# Patient Record
Sex: MALE | Race: WHITE | Employment: OTHER
[De-identification: names, ages, dates, MRNs, and addresses within clinical notes are randomized per-mention and may not be internally consistent; named-entity substitution may affect disease eponyms.]

---

## 2017-02-27 PROCEDURE — 83520 IMMUNOASSAY QUANT NOS NONAB: CPT | Performed by: INTERNAL MEDICINE

## 2017-11-29 PROBLEM — Z92.3 S/P RADIOACTIVE IODINE THYROID ABLATION: Status: ACTIVE | Noted: 2017-11-29

## 2018-01-10 ENCOUNTER — SURGERY (OUTPATIENT)
Age: 73
End: 2018-01-10

## 2018-01-10 ENCOUNTER — HOSPITAL ENCOUNTER (OUTPATIENT)
Facility: HOSPITAL | Age: 73
Setting detail: HOSPITAL OUTPATIENT SURGERY
Discharge: HOME | End: 2018-01-10
Attending: INTERNAL MEDICINE | Admitting: INTERNAL MEDICINE
Payer: MEDICARE

## 2018-01-10 ENCOUNTER — ANESTHESIA (OUTPATIENT)
Dept: ENDOSCOPY | Facility: HOSPITAL | Age: 73
End: 2018-01-10
Payer: MEDICARE

## 2018-01-10 ENCOUNTER — ANESTHESIA EVENT (OUTPATIENT)
Dept: ENDOSCOPY | Facility: HOSPITAL | Age: 73
End: 2018-01-10
Payer: MEDICARE

## 2018-01-10 VITALS
OXYGEN SATURATION: 93 % | TEMPERATURE: 99 F | RESPIRATION RATE: 16 BRPM | DIASTOLIC BLOOD PRESSURE: 93 MMHG | HEIGHT: 72 IN | WEIGHT: 135 LBS | BODY MASS INDEX: 18.28 KG/M2 | SYSTOLIC BLOOD PRESSURE: 162 MMHG | HEART RATE: 78 BPM

## 2018-01-10 DIAGNOSIS — Z92.3 S/P RADIOACTIVE IODINE THYROID ABLATION: ICD-10-CM

## 2018-01-10 DIAGNOSIS — J43.1 PANLOBULAR EMPHYSEMA (HCC): ICD-10-CM

## 2018-01-10 DIAGNOSIS — Z85.828 PERSONAL HISTORY OF SKIN CANCER: ICD-10-CM

## 2018-01-10 DIAGNOSIS — F17.200 TOBACCO USE DISORDER: ICD-10-CM

## 2018-01-10 DIAGNOSIS — L57.0 ACTINIC KERATOSIS: ICD-10-CM

## 2018-01-10 DIAGNOSIS — K26.5 DUODENAL ULCER PERFORATION (HCC): ICD-10-CM

## 2018-01-10 DIAGNOSIS — L81.4 SOLAR LENTIGINOSIS: ICD-10-CM

## 2018-01-10 DIAGNOSIS — E83.52 HYPERCALCEMIA: ICD-10-CM

## 2018-01-10 DIAGNOSIS — L82.1 OTHER SEBORRHEIC KERATOSIS: ICD-10-CM

## 2018-01-10 DIAGNOSIS — E78.2 MIXED HYPERLIPIDEMIA: ICD-10-CM

## 2018-01-10 DIAGNOSIS — E05.90 HYPERTHYROIDISM: ICD-10-CM

## 2018-01-10 DIAGNOSIS — I10 ESSENTIAL HYPERTENSION, BENIGN: Primary | ICD-10-CM

## 2018-01-10 DIAGNOSIS — D75.89 MACROCYTOSIS: ICD-10-CM

## 2018-01-10 PROCEDURE — 88341 IMHCHEM/IMCYTCHM EA ADD ANTB: CPT | Performed by: INTERNAL MEDICINE

## 2018-01-10 PROCEDURE — 81235 EGFR GENE COM VARIANTS: CPT | Performed by: INTERNAL MEDICINE

## 2018-01-10 PROCEDURE — 88177 CYTP FNA EVAL EA ADDL: CPT | Performed by: INTERNAL MEDICINE

## 2018-01-10 PROCEDURE — 0BB48ZX EXCISION OF RIGHT UPPER LOBE BRONCHUS, VIA NATURAL OR ARTIFICIAL OPENING ENDOSCOPIC, DIAGNOSTIC: ICD-10-PCS | Performed by: INTERNAL MEDICINE

## 2018-01-10 PROCEDURE — 88342 IMHCHEM/IMCYTCHM 1ST ANTB: CPT | Performed by: INTERNAL MEDICINE

## 2018-01-10 PROCEDURE — 88381 MICRODISSECTION MANUAL: CPT | Performed by: INTERNAL MEDICINE

## 2018-01-10 PROCEDURE — 81210 BRAF GENE: CPT | Performed by: INTERNAL MEDICINE

## 2018-01-10 PROCEDURE — 88173 CYTOPATH EVAL FNA REPORT: CPT | Performed by: INTERNAL MEDICINE

## 2018-01-10 PROCEDURE — 88172 CYTP DX EVAL FNA 1ST EA SITE: CPT | Performed by: INTERNAL MEDICINE

## 2018-01-10 PROCEDURE — 88360 TUMOR IMMUNOHISTOCHEM/MANUAL: CPT | Performed by: INTERNAL MEDICINE

## 2018-01-10 PROCEDURE — 88305 TISSUE EXAM BY PATHOLOGIST: CPT | Performed by: INTERNAL MEDICINE

## 2018-01-10 RX ORDER — SODIUM CHLORIDE, SODIUM LACTATE, POTASSIUM CHLORIDE, CALCIUM CHLORIDE 600; 310; 30; 20 MG/100ML; MG/100ML; MG/100ML; MG/100ML
INJECTION, SOLUTION INTRAVENOUS CONTINUOUS
Status: DISCONTINUED | OUTPATIENT
Start: 2018-01-10 | End: 2018-01-10 | Stop reason: HOSPADM

## 2018-01-10 RX ORDER — SODIUM CHLORIDE, SODIUM LACTATE, POTASSIUM CHLORIDE, CALCIUM CHLORIDE 600; 310; 30; 20 MG/100ML; MG/100ML; MG/100ML; MG/100ML
INJECTION, SOLUTION INTRAVENOUS CONTINUOUS
Status: DISCONTINUED | OUTPATIENT
Start: 2018-01-10 | End: 2018-01-10

## 2018-01-10 RX ORDER — ONDANSETRON 2 MG/ML
4 INJECTION INTRAMUSCULAR; INTRAVENOUS AS NEEDED
Status: DISCONTINUED | OUTPATIENT
Start: 2018-01-10 | End: 2018-01-10 | Stop reason: HOSPADM

## 2018-01-10 RX ORDER — MEPERIDINE HYDROCHLORIDE 25 MG/ML
12.5 INJECTION INTRAMUSCULAR; INTRAVENOUS; SUBCUTANEOUS AS NEEDED
Status: DISCONTINUED | OUTPATIENT
Start: 2018-01-10 | End: 2018-01-10 | Stop reason: HOSPADM

## 2018-01-10 RX ORDER — MIDAZOLAM HYDROCHLORIDE 1 MG/ML
1 INJECTION INTRAMUSCULAR; INTRAVENOUS EVERY 5 MIN PRN
Status: DISCONTINUED | OUTPATIENT
Start: 2018-01-10 | End: 2018-01-10 | Stop reason: HOSPADM

## 2018-01-10 RX ORDER — HYDROMORPHONE HYDROCHLORIDE 1 MG/ML
0.4 INJECTION, SOLUTION INTRAMUSCULAR; INTRAVENOUS; SUBCUTANEOUS EVERY 5 MIN PRN
Status: DISCONTINUED | OUTPATIENT
Start: 2018-01-10 | End: 2018-01-10 | Stop reason: HOSPADM

## 2018-01-10 RX ORDER — NALOXONE HYDROCHLORIDE 0.4 MG/ML
80 INJECTION, SOLUTION INTRAMUSCULAR; INTRAVENOUS; SUBCUTANEOUS AS NEEDED
Status: DISCONTINUED | OUTPATIENT
Start: 2018-01-10 | End: 2018-01-10 | Stop reason: HOSPADM

## 2018-01-10 NOTE — OPERATIVE REPORT
EBUS Bronchoscopy Procedure Report     Preop diagnosis:        Abnormal CT scan, right upper lobe lung mass  Postop diagnosis:       Abnormal CT scan, right upper lobe lung mass    Procedure performed:  Bronchoscopy, EBUS-TBNA     Sedation used:      Gener The EBUS bronchoscope was removed after bryan sampling was completed. The standard bronchoscope was reinserted and the airways were cleared of bloody debris. There was adequate hemostasis after the administration of iced saline.  The standard bronchoscope w

## 2018-01-10 NOTE — ANESTHESIA POSTPROCEDURE EVALUATION
[address] Patient Status:  Hospital Outpatient Surgery   Age/Gender 71year old male MRN [id number]   Location [address] [place] [address]. Attending Christy Franks MD   Hosp Day # 0 PCP Trisha Mcguire MD       Anesthesia Post-op

## 2018-01-10 NOTE — H&P
Preprocedure note    Please refer to my note dated 12/22/17. There has been no interval change    Sherwin Monzon M.D.   Pulmonary/Critical Care

## 2018-01-10 NOTE — ANESTHESIA PREPROCEDURE EVALUATION
PRE-OP EVALUATION    Patient Name: Tata Kim    Pre-op Diagnosis: ABNORMAL CT CHEST    Procedure(s):  ENDOBRONCHIAL ULTRASOUND BRONCHOSCOPY      Surgeon(s) and Role:     * Shellee Soulier, MD - Primary    Pre-op vitals reviewed. Temp: 97.7 °F (36. Location: [address] [place] [address] Road: OTHER SURGICAL HISTORY      Comment: ankle surgery  8/43/6177: REPAIR UMBILICAL OBJD,1+J/T,PKLRL      Comment: Procedure: UMBILICAL HERNIORRAPHY;  Surgeon:                Gwen Rea;  Location:

## 2018-01-17 LAB
ADEQUACY OF SPECIMEN: ADEQUATE
BRAF CODON 600 MUTATION DETECT: NOT DETECTED

## 2018-01-18 LAB
EGFR BY PYROSEQUENCING RESULT: NOT DETECTED
ROS1 BY IHC RESULT: NEGATIVE

## 2018-01-25 PROCEDURE — 84436 ASSAY OF TOTAL THYROXINE: CPT | Performed by: INTERNAL MEDICINE

## 2018-02-02 PROBLEM — C34.11 MALIGNANT NEOPLASM OF UPPER LOBE OF RIGHT LUNG (HCC): Status: ACTIVE | Noted: 2018-02-02

## 2018-02-06 ENCOUNTER — LAB ENCOUNTER (OUTPATIENT)
Dept: LAB | Age: 73
End: 2018-02-06
Attending: SURGERY
Payer: MEDICARE

## 2018-02-06 DIAGNOSIS — C34.11 MALIGNANT NEOPLASM OF UPPER LOBE OF RIGHT LUNG (HCC): Primary | ICD-10-CM

## 2018-02-06 PROCEDURE — 86900 BLOOD TYPING SEROLOGIC ABO: CPT

## 2018-02-06 PROCEDURE — 86901 BLOOD TYPING SEROLOGIC RH(D): CPT

## 2018-02-06 PROCEDURE — 86850 RBC ANTIBODY SCREEN: CPT

## 2018-02-07 ENCOUNTER — HOSPITAL (OUTPATIENT)
Dept: OTHER | Age: 73
End: 2018-02-07
Attending: SURGERY

## 2018-02-07 LAB
ANTIBODY SCREEN: NEGATIVE
RH BLOOD TYPE: POSITIVE

## 2018-03-13 PROCEDURE — 84436 ASSAY OF TOTAL THYROXINE: CPT | Performed by: INTERNAL MEDICINE

## 2018-04-13 PROBLEM — E89.0 POSTABLATIVE HYPOTHYROIDISM: Status: ACTIVE | Noted: 2018-04-13

## 2018-06-12 ENCOUNTER — LAB ENCOUNTER (OUTPATIENT)
Dept: LAB | Age: 73
End: 2018-06-12
Attending: INTERNAL MEDICINE
Payer: MEDICARE

## 2018-06-12 DIAGNOSIS — C34.11 MALIGNANT NEOPLASM OF UPPER LOBE OF RIGHT LUNG (HCC): Primary | ICD-10-CM

## 2018-06-12 PROCEDURE — 86304 IMMUNOASSAY TUMOR CA 125: CPT

## 2018-06-12 PROCEDURE — 82378 CARCINOEMBRYONIC ANTIGEN: CPT

## 2019-11-18 PROBLEM — E05.00 GRAVES DISEASE: Status: ACTIVE | Noted: 2019-11-18

## 2025-06-04 NOTE — PROGRESS NOTES
Spoke with the patient; he was aware of his diagnosis after talking to Dr. Pamela Villeda last night
04-Jun-2025 08:38

## (undated) DEVICE — MEDI-VAC SUCTION HANDLE REGULAR CAPACITY: Brand: CARDINAL HEALTH

## (undated) DEVICE — SINGLE USE BIOPSY VALVE MAJ-210: Brand: SINGLE USE BIOPSY VALVE (STERILE)

## (undated) DEVICE — 1200CC GUARDIAN II: Brand: GUARDIAN

## (undated) DEVICE — BOWLS UTILITY 16OZ

## (undated) DEVICE — 3M™ RED DOT™ MONITORING ELECTRODE WITH FOAM TAPE AND STICKY GEL, 50/BAG, 20/CASE, 72/PLT 2570: Brand: RED DOT™

## (undated) DEVICE — SINGLE USE SUCTION VALVE MAJ-209: Brand: SINGLE USE SUCTION VALVE (STERILE)

## (undated) DEVICE — ENDOSCOPY PACK UPPER: Brand: MEDLINE INDUSTRIES, INC.

## (undated) DEVICE — NEEDLE ASP VIZISHOT 22GA 1.8MM

## (undated) DEVICE — LENS PLASTIC DISP EYEWEAR

## (undated) DEVICE — LENS FRAMES DISP EYEWEAR

## (undated) DEVICE — FILTERLINE NASAL ADULT O2/CO2

## (undated) DEVICE — FORCEPS BX 100CM 1.8MM RJ STD

## (undated) DEVICE — SYRINGE 10ML SLIP TIP

## (undated) DEVICE — MEDI-VAC NON-CONDUCTIVE SUCTION TUBING: Brand: CARDINAL HEALTH

## (undated) DEVICE — 60 ML SYRINGE REGULAR TIP: Brand: MONOJECT

## (undated) DEVICE — MASK ISOLATION

## (undated) DEVICE — GLOVE SURG TRIUMPH SZ 71/2